# Patient Record
Sex: FEMALE | Race: WHITE | NOT HISPANIC OR LATINO | URBAN - METROPOLITAN AREA
[De-identification: names, ages, dates, MRNs, and addresses within clinical notes are randomized per-mention and may not be internally consistent; named-entity substitution may affect disease eponyms.]

---

## 2020-08-19 ENCOUNTER — OFFICE VISIT (OUTPATIENT)
Dept: URBAN - METROPOLITAN AREA CLINIC 111 | Facility: CLINIC | Age: 85
End: 2020-08-19

## 2021-03-02 ENCOUNTER — TELEPHONE ENCOUNTER (OUTPATIENT)
Dept: URBAN - METROPOLITAN AREA CLINIC 12 | Facility: CLINIC | Age: 86
End: 2021-03-02

## 2021-03-05 LAB
ALBUMIN: 4.4
ALKALINE PHOSPHATASE: 90
ALT (SGPT): 19
AST (SGOT): 25
BILIRUBIN, DIRECT: 0.11
BILIRUBIN, TOTAL: 0.3
HEMATOCRIT: 38.1
HEMOGLOBIN: 13.1
MCH: 33.7
MCHC: 34.4
MCV: 98
NRBC: (no result)
PLATELETS: 279
PROTEIN, TOTAL: 7
RBC: 3.89
RDW: 13.1
WBC: 7

## 2021-03-23 ENCOUNTER — OFFICE VISIT (OUTPATIENT)
Dept: URBAN - METROPOLITAN AREA CLINIC 12 | Facility: CLINIC | Age: 86
End: 2021-03-23
Payer: MEDICARE

## 2021-03-23 VITALS
HEART RATE: 61 BPM | WEIGHT: 142 LBS | SYSTOLIC BLOOD PRESSURE: 146 MMHG | HEIGHT: 64 IN | DIASTOLIC BLOOD PRESSURE: 70 MMHG | TEMPERATURE: 96.8 F | BODY MASS INDEX: 24.24 KG/M2

## 2021-03-23 DIAGNOSIS — R14.0 BLOATING AND GAS: ICD-10-CM

## 2021-03-23 PROCEDURE — 99213 OFFICE O/P EST LOW 20 MIN: CPT | Performed by: INTERNAL MEDICINE

## 2021-03-23 RX ORDER — SACCHAROMYCES BOULARDII 250 MG
CAPSULE ORAL
Qty: 0 | Refills: 0 | Status: ACTIVE | COMMUNITY
Start: 1900-01-01

## 2021-03-23 RX ORDER — NIFEDIPINE 30 MG/1
TABLET, EXTENDED RELEASE ORAL
Qty: 0 | Refills: 0 | Status: ACTIVE | COMMUNITY
Start: 1900-01-01

## 2021-03-23 RX ORDER — CLONIDINE HYDROCHLORIDE 0.1 MG/1
TABLET ORAL
Qty: 0 | Refills: 0 | COMMUNITY
Start: 1900-01-01

## 2021-03-23 RX ORDER — LOPERAMIDE HYDROCHLORIDE 2 MG/1
TAKE 2 CAPSULES (4 MG) BY ORAL ROUTE AFTER 1ST LOOSE STOOL, FOLLOWED BY 1 CAPSULE AFTER EACH SUBSEQUENT LOOSE STOOL NOT TO EXCEED 16 MG/DAY CAPSULE ORAL
Qty: 2 | Refills: 0 | Status: ACTIVE | COMMUNITY
Start: 1900-01-01

## 2021-03-23 RX ORDER — BIMATOPROST 0.1 MG/ML
SOLUTION/ DROPS OPHTHALMIC
Qty: 1 | Refills: 0 | Status: ACTIVE | COMMUNITY
Start: 1900-01-01

## 2021-03-23 RX ORDER — QUINAPRIL HYDROCHLORIDE 20 MG/1
TABLET, FILM COATED ORAL
Qty: 0 | Refills: 0 | COMMUNITY
Start: 1900-01-01

## 2021-03-23 RX ORDER — COLD-HOT PACK
EACH MISCELLANEOUS
Qty: 0 | Refills: 0 | COMMUNITY
Start: 1900-01-01

## 2021-03-23 RX ORDER — BRIMONIDINE TARTRATE 1 MG/ML
1 DROP INTO AFFECTED EYE SOLUTION/ DROPS OPHTHALMIC
Status: ACTIVE | COMMUNITY

## 2021-03-23 RX ORDER — ESCITALOPRAM 20 MG/1
TAKE 1 TABLET (20 MG) BY ORAL ROUTE ONCE DAILY TABLET, FILM COATED ORAL 1
Qty: 0 | Refills: 0 | Status: ACTIVE | COMMUNITY
Start: 1900-01-01

## 2021-03-23 RX ORDER — CLONAZEPAM 0.25 MG/1
TABLET, ORALLY DISINTEGRATING ORAL
Qty: 0 | Refills: 0 | COMMUNITY
Start: 1900-01-01

## 2021-03-23 RX ORDER — CETIRIZINE HYDROCHLORIDE 10 MG/1
TABLET, FILM COATED ORAL
Qty: 0 | Refills: 0 | COMMUNITY
Start: 1900-01-01

## 2021-03-23 RX ORDER — SODIUM CHLORIDE 0.65 %
AEROSOL, SPRAY (ML) NASAL
Qty: 1 | Refills: 0 | Status: ACTIVE | COMMUNITY
Start: 1900-01-01

## 2021-03-23 RX ORDER — PEPPERMINT OIL 90 MG
1 CAPSULE CAPSULE, DELAYED, AND EXTENDED RELEASE ORAL
Qty: 30 | Refills: 5 | OUTPATIENT
Start: 2021-03-23 | End: 2021-09-19

## 2021-03-23 RX ORDER — CHLORHEXIDINE GLUCONATE 4 %
LIQUID (ML) TOPICAL
Qty: 0 | Refills: 0 | COMMUNITY
Start: 1900-01-01

## 2021-03-23 RX ORDER — DORZOLAMIDE HCL 20 MG/ML
1 DROP INTO AFFECTED EYE SOLUTION/ DROPS OPHTHALMIC
Status: ACTIVE | COMMUNITY

## 2021-03-23 RX ORDER — PROMETHAZINE HYDROCHLORIDE 25 MG/1
1 TABLET AS NEEDED TABLET ORAL
Status: ACTIVE | COMMUNITY

## 2021-03-23 RX ORDER — AZATHIOPRINE 50 1/1
TABLET ORAL
Qty: 0 | Refills: 0 | Status: ACTIVE | COMMUNITY
Start: 1900-01-01

## 2021-03-23 RX ORDER — SENNOSIDES 8.6 MG/1
TABLET, FILM COATED ORAL
Qty: 0 | Refills: 0 | COMMUNITY
Start: 1900-01-01

## 2021-03-23 NOTE — HPI-TODAY'S VISIT:
85F here for routine f/u. An assistant from the facility where derrek lives is here with her AIH Panc cyst--pt and dtr dont want any further w/u H/o GI bleed--pt and dtr did not want egd. resolved on its own . she feels fine. no major complaints no overt gi bleed. no abd pain, jaundice, N/V  she does have bloating and gas on and off. BM regular.  labs--normal Hb and LFTs

## 2022-04-11 ENCOUNTER — TELEPHONE ENCOUNTER (OUTPATIENT)
Dept: URBAN - METROPOLITAN AREA CLINIC 12 | Facility: CLINIC | Age: 87
End: 2022-04-11

## 2022-04-23 LAB
ALBUMIN: 4.3
ALKALINE PHOSPHATASE: 103
ALT (SGPT): 25
AST (SGOT): 37
BILIRUBIN, DIRECT: 0.15
BILIRUBIN, TOTAL: 0.6
HEMATOCRIT: 38.1
HEMOGLOBIN: 13.1
MCH: 33.2
MCHC: 34.4
MCV: 97
NRBC: (no result)
PLATELETS: 293
PROTEIN, TOTAL: 7.4
RBC: 3.94
RDW: 12.9
WBC: 6.4

## 2022-04-26 ENCOUNTER — OFFICE VISIT (OUTPATIENT)
Dept: URBAN - METROPOLITAN AREA CLINIC 12 | Facility: CLINIC | Age: 87
End: 2022-04-26

## 2022-04-26 ENCOUNTER — OFFICE VISIT (OUTPATIENT)
Dept: URBAN - METROPOLITAN AREA CLINIC 23 | Facility: CLINIC | Age: 87
End: 2022-04-26
Payer: MEDICARE

## 2022-04-26 ENCOUNTER — WEB ENCOUNTER (OUTPATIENT)
Dept: URBAN - METROPOLITAN AREA CLINIC 23 | Facility: CLINIC | Age: 87
End: 2022-04-26

## 2022-04-26 DIAGNOSIS — K75.4 AUTOIMMUNE HEPATITIS: ICD-10-CM

## 2022-04-26 PROCEDURE — 99213 OFFICE O/P EST LOW 20 MIN: CPT | Performed by: INTERNAL MEDICINE

## 2022-04-26 RX ORDER — NIFEDIPINE 30 MG/1
TABLET, EXTENDED RELEASE ORAL
Qty: 0 | Refills: 0 | Status: ACTIVE | COMMUNITY
Start: 1900-01-01

## 2022-04-26 RX ORDER — SACCHAROMYCES BOULARDII 250 MG
CAPSULE ORAL
Qty: 0 | Refills: 0 | Status: ACTIVE | COMMUNITY
Start: 1900-01-01

## 2022-04-26 RX ORDER — ESCITALOPRAM 20 MG/1
TAKE 1 TABLET (20 MG) BY ORAL ROUTE ONCE DAILY TABLET, FILM COATED ORAL 1
Qty: 0 | Refills: 0 | Status: ACTIVE | COMMUNITY
Start: 1900-01-01

## 2022-04-26 RX ORDER — BIMATOPROST 0.1 MG/ML
SOLUTION/ DROPS OPHTHALMIC
Qty: 1 | Refills: 0 | Status: ACTIVE | COMMUNITY
Start: 1900-01-01

## 2022-04-26 RX ORDER — CLONAZEPAM 0.25 MG/1
TABLET, ORALLY DISINTEGRATING ORAL
Qty: 0 | Refills: 0 | COMMUNITY
Start: 1900-01-01

## 2022-04-26 RX ORDER — CLONIDINE HYDROCHLORIDE 0.1 MG/1
TABLET ORAL
Qty: 0 | Refills: 0 | COMMUNITY
Start: 1900-01-01

## 2022-04-26 RX ORDER — CHLORHEXIDINE GLUCONATE 4 %
LIQUID (ML) TOPICAL
Qty: 0 | Refills: 0 | COMMUNITY
Start: 1900-01-01

## 2022-04-26 RX ORDER — PROMETHAZINE HYDROCHLORIDE 25 MG/1
1 TABLET AS NEEDED TABLET ORAL
Status: ACTIVE | COMMUNITY

## 2022-04-26 RX ORDER — LOPERAMIDE HYDROCHLORIDE 2 MG/1
TAKE 2 CAPSULES (4 MG) BY ORAL ROUTE AFTER 1ST LOOSE STOOL, FOLLOWED BY 1 CAPSULE AFTER EACH SUBSEQUENT LOOSE STOOL NOT TO EXCEED 16 MG/DAY CAPSULE ORAL
Qty: 2 | Refills: 0 | COMMUNITY
Start: 1900-01-01

## 2022-04-26 RX ORDER — AZATHIOPRINE 50 1/1
TABLET ORAL
Qty: 0 | Refills: 0 | Status: ACTIVE | COMMUNITY
Start: 1900-01-01

## 2022-04-26 RX ORDER — COLD-HOT PACK
EACH MISCELLANEOUS
Qty: 0 | Refills: 0 | COMMUNITY
Start: 1900-01-01

## 2022-04-26 RX ORDER — ESCITALOPRAM 20 MG/1
TAKE 1 TABLET (20 MG) BY ORAL ROUTE ONCE DAILY TABLET, FILM COATED ORAL 1
Qty: 0 | Refills: 0 | COMMUNITY
Start: 1900-01-01

## 2022-04-26 RX ORDER — NIFEDIPINE 30 MG/1
TABLET, EXTENDED RELEASE ORAL
Qty: 0 | Refills: 0 | COMMUNITY
Start: 1900-01-01

## 2022-04-26 RX ORDER — SODIUM CHLORIDE 0.65 %
AEROSOL, SPRAY (ML) NASAL
Qty: 1 | Refills: 0 | COMMUNITY
Start: 1900-01-01

## 2022-04-26 RX ORDER — SACCHAROMYCES BOULARDII 250 MG
CAPSULE ORAL
Qty: 0 | Refills: 0 | COMMUNITY
Start: 1900-01-01

## 2022-04-26 RX ORDER — DORZOLAMIDE HCL 20 MG/ML
1 DROP INTO AFFECTED EYE SOLUTION/ DROPS OPHTHALMIC
COMMUNITY

## 2022-04-26 RX ORDER — BRIMONIDINE TARTRATE 1 MG/ML
1 DROP INTO AFFECTED EYE SOLUTION/ DROPS OPHTHALMIC
Status: ACTIVE | COMMUNITY

## 2022-04-26 RX ORDER — DORZOLAMIDE HCL 20 MG/ML
1 DROP INTO AFFECTED EYE SOLUTION/ DROPS OPHTHALMIC
Status: ACTIVE | COMMUNITY

## 2022-04-26 RX ORDER — CETIRIZINE HYDROCHLORIDE 10 MG/1
TABLET, FILM COATED ORAL
Qty: 0 | Refills: 0 | COMMUNITY
Start: 1900-01-01

## 2022-04-26 RX ORDER — SENNOSIDES 8.6 MG/1
TABLET, FILM COATED ORAL
Qty: 0 | Refills: 0 | COMMUNITY
Start: 1900-01-01

## 2022-04-26 RX ORDER — SODIUM CHLORIDE 0.65 %
AEROSOL, SPRAY (ML) NASAL
Qty: 1 | Refills: 0 | Status: ACTIVE | COMMUNITY
Start: 1900-01-01

## 2022-04-26 RX ORDER — BRIMONIDINE TARTRATE 1 MG/ML
1 DROP INTO AFFECTED EYE SOLUTION/ DROPS OPHTHALMIC
COMMUNITY

## 2022-04-26 RX ORDER — BIMATOPROST 0.1 MG/ML
SOLUTION/ DROPS OPHTHALMIC
Qty: 1 | Refills: 0 | COMMUNITY
Start: 1900-01-01

## 2022-04-26 RX ORDER — PROMETHAZINE HYDROCHLORIDE 25 MG/1
1 TABLET AS NEEDED TABLET ORAL
COMMUNITY

## 2022-04-26 RX ORDER — AZATHIOPRINE 50 1/1
TABLET ORAL
Qty: 0 | Refills: 0 | COMMUNITY
Start: 1900-01-01

## 2022-04-26 RX ORDER — LOPERAMIDE HYDROCHLORIDE 2 MG/1
TAKE 2 CAPSULES (4 MG) BY ORAL ROUTE AFTER 1ST LOOSE STOOL, FOLLOWED BY 1 CAPSULE AFTER EACH SUBSEQUENT LOOSE STOOL NOT TO EXCEED 16 MG/DAY CAPSULE ORAL
Qty: 2 | Refills: 0 | Status: ACTIVE | COMMUNITY
Start: 1900-01-01

## 2022-04-26 RX ORDER — QUINAPRIL HYDROCHLORIDE 20 MG/1
TABLET, FILM COATED ORAL
Qty: 0 | Refills: 0 | COMMUNITY
Start: 1900-01-01

## 2022-04-26 NOTE — HPI-TODAY'S VISIT:
86F here for routine f/u.  AIH--on azathioprine Panc cyst--pt and dtr dont want any further w/u H/o GI bleed--pt and dtr did not want egd. resolved on its own . she feels fine. no major complaints no overt gi bleed. no abd pain, jaundice, N/V  labs 4/2022--normal Hb and LFTs

## 2023-02-23 ENCOUNTER — TELEPHONE ENCOUNTER (OUTPATIENT)
Dept: URBAN - METROPOLITAN AREA CLINIC 23 | Facility: CLINIC | Age: 88
End: 2023-02-23

## 2023-05-16 ENCOUNTER — OFFICE VISIT (OUTPATIENT)
Dept: URBAN - METROPOLITAN AREA CLINIC 111 | Facility: CLINIC | Age: 88
End: 2023-05-16

## 2023-05-23 ENCOUNTER — TELEPHONE ENCOUNTER (OUTPATIENT)
Dept: URBAN - METROPOLITAN AREA CLINIC 23 | Facility: CLINIC | Age: 88
End: 2023-05-23

## 2023-07-18 ENCOUNTER — OFFICE VISIT (OUTPATIENT)
Dept: URBAN - METROPOLITAN AREA CLINIC 111 | Facility: CLINIC | Age: 88
End: 2023-07-18

## 2023-07-25 ENCOUNTER — TELEPHONE ENCOUNTER (OUTPATIENT)
Dept: URBAN - METROPOLITAN AREA CLINIC 111 | Facility: CLINIC | Age: 88
End: 2023-07-25

## 2023-07-28 LAB
A/G RATIO: 1.3
ALBUMIN: 4.1
ALKALINE PHOSPHATASE: 72
ALT (SGPT): 21
AST (SGOT): 29
BILIRUBIN, TOTAL: 0.7
BUN/CREATININE RATIO: (no result)
BUN: 13
CALCIUM: 8.9
CARBON DIOXIDE, TOTAL: 28
CHLORIDE: 101
CREATININE: 0.74
EGFR: 79
GLOBULIN, TOTAL: 3.1
GLUCOSE: 99
HEMATOCRIT: 37.4
HEMOGLOBIN: 13.1
MCH: 33.2
MCHC: 35
MCV: 94.9
MPV: 9.7
PLATELET COUNT: 294
POTASSIUM: 4.4
PROTEIN, TOTAL: 7.2
RDW: 13.7
RED BLOOD CELL COUNT: 3.94
SODIUM: 136
WHITE BLOOD CELL COUNT: 6.4

## 2023-08-01 ENCOUNTER — WEB ENCOUNTER (OUTPATIENT)
Dept: URBAN - METROPOLITAN AREA CLINIC 111 | Facility: CLINIC | Age: 88
End: 2023-08-01

## 2023-08-01 ENCOUNTER — OFFICE VISIT (OUTPATIENT)
Dept: URBAN - METROPOLITAN AREA CLINIC 111 | Facility: CLINIC | Age: 88
End: 2023-08-01
Payer: MEDICARE

## 2023-08-01 ENCOUNTER — DASHBOARD ENCOUNTERS (OUTPATIENT)
Age: 88
End: 2023-08-01

## 2023-08-01 VITALS
SYSTOLIC BLOOD PRESSURE: 136 MMHG | BODY MASS INDEX: 24.01 KG/M2 | WEIGHT: 140.6 LBS | HEIGHT: 64 IN | HEART RATE: 62 BPM | DIASTOLIC BLOOD PRESSURE: 74 MMHG

## 2023-08-01 DIAGNOSIS — K75.4 AUTOIMMUNE HEPATITIS: ICD-10-CM

## 2023-08-01 PROCEDURE — 99213 OFFICE O/P EST LOW 20 MIN: CPT | Performed by: INTERNAL MEDICINE

## 2023-08-01 RX ORDER — SODIUM CHLORIDE 0.65 %
AEROSOL, SPRAY (ML) NASAL
Qty: 1 | Refills: 0 | Status: ON HOLD | COMMUNITY
Start: 1900-01-01

## 2023-08-01 RX ORDER — FLUOROURACIL 5 MG/G
1 APPLICATION CREAM TOPICAL
Status: ACTIVE | COMMUNITY

## 2023-08-01 RX ORDER — LISINOPRIL 20 MG/1
1 TABLET TABLET ORAL
Status: ACTIVE | COMMUNITY

## 2023-08-01 RX ORDER — TRAZODONE HYDROCHLORIDE 50 MG/1
1 TABLET AT BEDTIME AS NEEDED TABLET ORAL
Status: ACTIVE | COMMUNITY

## 2023-08-01 RX ORDER — NIFEDIPINE 30 MG/1
TABLET, EXTENDED RELEASE ORAL
Qty: 0 | Refills: 0 | Status: ACTIVE | COMMUNITY
Start: 1900-01-01

## 2023-08-01 RX ORDER — CETIRIZINE HYDROCHLORIDE 10 MG/1
TABLET, FILM COATED ORAL
Qty: 0 | Refills: 0 | Status: ACTIVE | COMMUNITY
Start: 1900-01-01

## 2023-08-01 RX ORDER — CHLORHEXIDINE GLUCONATE 4 %
LIQUID (ML) TOPICAL
Qty: 0 | Refills: 0 | Status: ON HOLD | COMMUNITY
Start: 1900-01-01

## 2023-08-01 RX ORDER — ICOSAPENT ETHYL 1 G/1
2 CAPSULES WITH MEALS CAPSULE ORAL
Status: ACTIVE | COMMUNITY

## 2023-08-01 RX ORDER — DORZOLAMIDE HCL 20 MG/ML
1 DROP INTO AFFECTED EYE SOLUTION/ DROPS OPHTHALMIC
Status: ACTIVE | COMMUNITY

## 2023-08-01 RX ORDER — LOPERAMIDE HYDROCHLORIDE 2 MG/1
TAKE 2 CAPSULES (4 MG) BY ORAL ROUTE AFTER 1ST LOOSE STOOL, FOLLOWED BY 1 CAPSULE AFTER EACH SUBSEQUENT LOOSE STOOL NOT TO EXCEED 16 MG/DAY CAPSULE ORAL
Qty: 2 | Refills: 0 | Status: ACTIVE | COMMUNITY
Start: 1900-01-01

## 2023-08-01 RX ORDER — LANOLIN ALCOHOL/MO/W.PET/CERES
AS DIRECTED CREAM (GRAM) TOPICAL
Status: ACTIVE | COMMUNITY

## 2023-08-01 RX ORDER — QUINAPRIL HYDROCHLORIDE 20 MG/1
TABLET, FILM COATED ORAL
Qty: 0 | Refills: 0 | Status: ON HOLD | COMMUNITY
Start: 1900-01-01

## 2023-08-01 RX ORDER — BRIMONIDINE TARTRATE 1 MG/ML
1 DROP INTO AFFECTED EYE SOLUTION/ DROPS OPHTHALMIC
Status: ACTIVE | COMMUNITY

## 2023-08-01 RX ORDER — AZATHIOPRINE 50 1/1
TABLET ORAL
Qty: 0 | Refills: 0 | Status: ACTIVE | COMMUNITY
Start: 1900-01-01

## 2023-08-01 RX ORDER — COLD-HOT PACK
EACH MISCELLANEOUS
Qty: 0 | Refills: 0 | Status: ON HOLD | COMMUNITY
Start: 1900-01-01

## 2023-08-01 RX ORDER — PEPPERMINT OIL 90 MG
AS DIRECTED CAPSULE, DELAYED, AND EXTENDED RELEASE ORAL
Status: ACTIVE | COMMUNITY

## 2023-08-01 RX ORDER — CLONIDINE HYDROCHLORIDE 0.1 MG/1
TABLET ORAL
Qty: 0 | Refills: 0 | Status: ACTIVE | COMMUNITY
Start: 1900-01-01

## 2023-08-01 RX ORDER — SENNOSIDES 8.6 MG/1
TABLET, FILM COATED ORAL
Qty: 0 | Refills: 0 | Status: ACTIVE | COMMUNITY
Start: 1900-01-01

## 2023-08-01 RX ORDER — ESCITALOPRAM 20 MG/1
TAKE 1 TABLET (20 MG) BY ORAL ROUTE ONCE DAILY TABLET, FILM COATED ORAL 1
Qty: 0 | Refills: 0 | Status: ACTIVE | COMMUNITY
Start: 1900-01-01

## 2023-08-01 RX ORDER — CLONAZEPAM 0.25 MG/1
TABLET, ORALLY DISINTEGRATING ORAL
Qty: 0 | Refills: 0 | Status: ACTIVE | COMMUNITY
Start: 1900-01-01

## 2023-08-01 RX ORDER — PROMETHAZINE HYDROCHLORIDE 25 MG/1
1 TABLET AS NEEDED TABLET ORAL
Status: ON HOLD | COMMUNITY

## 2023-08-01 RX ORDER — SACCHAROMYCES BOULARDII 250 MG
CAPSULE ORAL
Qty: 0 | Refills: 0 | Status: ACTIVE | COMMUNITY
Start: 1900-01-01

## 2023-08-01 RX ORDER — BIMATOPROST 0.1 MG/ML
SOLUTION/ DROPS OPHTHALMIC
Qty: 1 | Refills: 0 | Status: ACTIVE | COMMUNITY
Start: 1900-01-01

## 2023-08-01 RX ORDER — KETOCONAZOLE 20 MG/ML
AS DIRECTED SHAMPOO, SUSPENSION TOPICAL
Status: ACTIVE | COMMUNITY

## 2023-08-01 NOTE — HPI-TODAY'S VISIT:
87F here for routine f/u.  AIH--on azathioprine Panc cyst--pt and dtr dont want any further w/u H/o GI bleed--pt and dtr did not want egd. resolved on its own . she feels fine. no major complaints no overt gi bleed. no abd pain, jaundice, N/V  labs 7/2023--normal CBC and LFTs

## 2024-01-11 ENCOUNTER — APPOINTMENT (RX ONLY)
Dept: URBAN - METROPOLITAN AREA CLINIC 159 | Facility: CLINIC | Age: 89
Setting detail: DERMATOLOGY
End: 2024-01-11

## 2024-01-11 DIAGNOSIS — D49.2 NEOPLASM OF UNSPECIFIED BEHAVIOR OF BONE, SOFT TISSUE, AND SKIN: ICD-10-CM

## 2024-01-11 PROCEDURE — 99212 OFFICE O/P EST SF 10 MIN: CPT

## 2024-01-11 PROCEDURE — ? COUNSELING

## 2024-01-11 ASSESSMENT — LOCATION SIMPLE DESCRIPTION DERM: LOCATION SIMPLE: LEFT INFERIOR EYELID

## 2024-01-11 ASSESSMENT — LOCATION ZONE DERM: LOCATION ZONE: EYELID

## 2024-01-11 ASSESSMENT — LOCATION DETAILED DESCRIPTION DERM: LOCATION DETAILED: LEFT INFERIOR LID MARGIN

## 2024-01-11 NOTE — PROCEDURE: COUNSELING
Detail Level: Detailed
Patient Specific Counseling (Will Not Stick From Patient To Patient): Pt. Here today with caregiver. Pt. Seen by Dr. Snow who rec. biopsy but Pt. Declined. Pt. States eyelid was biopsied back in 2016, reports BCC . Excision done by Dr. Roman. Caregiver states spot on eyelid has been present x 3 years since she's been caring for patient.\\n\\nDiscussed oral treatment as option. Risk of possible hair loss discussed. \\n\\nDiscussed risk of not treating including non-healing lesion, getting larger, obstructive vision. \\n\\nPt. To sign MR to have records sent from Dr. Roman

## 2024-05-28 ENCOUNTER — APPOINTMENT (RX ONLY)
Dept: URBAN - METROPOLITAN AREA CLINIC 159 | Facility: CLINIC | Age: 89
Setting detail: DERMATOLOGY
End: 2024-05-28

## 2024-05-28 DIAGNOSIS — D485 NEOPLASM OF UNCERTAIN BEHAVIOR OF SKIN: ICD-10-CM | Status: INADEQUATELY CONTROLLED

## 2024-05-28 PROBLEM — D48.5 NEOPLASM OF UNCERTAIN BEHAVIOR OF SKIN: Status: ACTIVE | Noted: 2024-05-28

## 2024-05-28 PROCEDURE — 99212 OFFICE O/P EST SF 10 MIN: CPT

## 2024-05-28 PROCEDURE — ? DIAGNOSIS COMMENT

## 2024-05-28 PROCEDURE — ? COUNSELING

## 2024-05-28 ASSESSMENT — LOCATION ZONE DERM: LOCATION ZONE: EYELID

## 2024-05-28 ASSESSMENT — LOCATION DETAILED DESCRIPTION DERM: LOCATION DETAILED: LEFT LATERAL INFERIOR EYELID

## 2024-05-28 ASSESSMENT — LOCATION SIMPLE DESCRIPTION DERM: LOCATION SIMPLE: LEFT INFERIOR EYELID

## 2024-05-28 NOTE — PROCEDURE: DIAGNOSIS COMMENT
Render Risk Assessment In Note?: no
Comment: Discussed following up with  or ophthalmologist for consultation on best treatment action for patient , discussed MOHS surgery for this site vs oral chemo therapy r/b/s.e discussed vs not treating r/b/s.e discussed
Detail Level: Simple

## 2024-05-28 NOTE — PROCEDURE: MIPS QUALITY
Quality 47: Advance Care Plan: Advance Care Planning discussed and documented; advance care plan or surrogate decision maker documented in the medical record.
Quality 226: Preventive Care And Screening: Tobacco Use: Screening And Cessation Intervention: Patient screened for tobacco use and is an ex/non-smoker
Detail Level: Detailed
Name And Contact Information For Health Care Proxy: Kailey Sorensen daughter [phone]

## 2024-05-28 NOTE — HPI: SKIN LESION
Is This A New Presentation, Or A Follow-Up?: Follow Up Skin Lesion
How Severe Is Your Skin Lesion?: moderate
Has Your Skin Lesion Been Treated?: not been treated
Additional History: Was seen by [facility] and [facility]. Area has not been biopsied. Uncertain if want to do any intervention.

## 2024-08-08 ENCOUNTER — TELEPHONE ENCOUNTER (OUTPATIENT)
Dept: URBAN - METROPOLITAN AREA CLINIC 111 | Facility: CLINIC | Age: 89
End: 2024-08-08

## 2024-08-14 LAB
ABSOLUTE BASOPHILS: 29
ABSOLUTE EOSINOPHILS: 70
ABSOLUTE LYMPHOCYTES: 1966
ABSOLUTE MONOCYTES: 476
ABSOLUTE NEUTROPHILS: 3260
ALBUMIN/GLOBULIN RATIO: 1.3
ALBUMIN: 4
ALKALINE PHOSPHATASE: 100
ALT (SGPT): 31
AST (SGOT): 46
BASOPHILS: 0.5
BILIRUBIN, DIRECT: 0.2
BILIRUBIN, INDIRECT: 0.5
BILIRUBIN, TOTAL: 0.7
EOSINOPHILS: 1.2
GLOBULIN: 3
HEMATOCRIT: 40.6
HEMOGLOBIN: 13.5
LYMPHOCYTES: 33.9
MCH: 32.8
MCHC: 33.3
MCV: 98.5
MONOCYTES: 8.2
MPV: 10.4
NEUTROPHILS: 56.2
PLATELET COUNT: 265
PROTEIN, TOTAL: 7
RDW: 12.8
RED BLOOD CELL COUNT: 4.12
WHITE BLOOD CELL COUNT: 5.8

## 2024-08-19 ENCOUNTER — OFFICE VISIT (OUTPATIENT)
Dept: URBAN - METROPOLITAN AREA CLINIC 111 | Facility: CLINIC | Age: 89
End: 2024-08-19
Payer: MEDICARE

## 2024-08-19 VITALS
BODY MASS INDEX: 21 KG/M2 | HEART RATE: 77 BPM | DIASTOLIC BLOOD PRESSURE: 62 MMHG | HEIGHT: 64 IN | SYSTOLIC BLOOD PRESSURE: 109 MMHG | TEMPERATURE: 97.5 F | WEIGHT: 123 LBS

## 2024-08-19 DIAGNOSIS — Z51.5 HOSPICE CARE PATIENT: ICD-10-CM

## 2024-08-19 DIAGNOSIS — R79.89 ELEVATED LFTS: ICD-10-CM

## 2024-08-19 DIAGNOSIS — K75.4 AUTOIMMUNE HEPATITIS: ICD-10-CM

## 2024-08-19 PROCEDURE — 99214 OFFICE O/P EST MOD 30 MIN: CPT | Performed by: PHYSICIAN ASSISTANT

## 2024-08-19 RX ORDER — BRIMONIDINE TARTRATE 1 MG/ML
1 DROP INTO AFFECTED EYE SOLUTION/ DROPS OPHTHALMIC
Status: ACTIVE | COMMUNITY

## 2024-08-19 RX ORDER — CETIRIZINE HYDROCHLORIDE 10 MG/1
TABLET, FILM COATED ORAL
Qty: 0 | Refills: 0 | Status: ACTIVE | COMMUNITY
Start: 1900-01-01

## 2024-08-19 RX ORDER — DORZOLAMIDE HCL 20 MG/ML
1 DROP INTO AFFECTED EYE SOLUTION/ DROPS OPHTHALMIC
Status: ACTIVE | COMMUNITY

## 2024-08-19 RX ORDER — LOPERAMIDE HYDROCHLORIDE 2 MG/1
TAKE 2 CAPSULES (4 MG) BY ORAL ROUTE AFTER 1ST LOOSE STOOL, FOLLOWED BY 1 CAPSULE AFTER EACH SUBSEQUENT LOOSE STOOL NOT TO EXCEED 16 MG/DAY CAPSULE ORAL
Qty: 2 | Refills: 0 | Status: ACTIVE | COMMUNITY
Start: 1900-01-01

## 2024-08-19 RX ORDER — QUINAPRIL HYDROCHLORIDE 20 MG/1
TABLET, FILM COATED ORAL
Qty: 0 | Refills: 0 | Status: ON HOLD | COMMUNITY
Start: 1900-01-01

## 2024-08-19 RX ORDER — NIFEDIPINE 30 MG/1
TABLET, EXTENDED RELEASE ORAL
Qty: 0 | Refills: 0 | Status: ACTIVE | COMMUNITY
Start: 1900-01-01

## 2024-08-19 RX ORDER — CLONAZEPAM 0.25 MG/1
TABLET, ORALLY DISINTEGRATING ORAL
Qty: 0 | Refills: 0 | Status: ACTIVE | COMMUNITY
Start: 1900-01-01

## 2024-08-19 RX ORDER — PEPPERMINT OIL 90 MG
AS DIRECTED CAPSULE, DELAYED, AND EXTENDED RELEASE ORAL
Status: ACTIVE | COMMUNITY

## 2024-08-19 RX ORDER — AZATHIOPRINE 50 1/1
TABLET ORAL
Qty: 0 | Refills: 0 | Status: ACTIVE | COMMUNITY
Start: 1900-01-01

## 2024-08-19 RX ORDER — ESCITALOPRAM 20 MG/1
TAKE 1 TABLET (20 MG) BY ORAL ROUTE ONCE DAILY TABLET, FILM COATED ORAL 1
Qty: 0 | Refills: 0 | Status: ACTIVE | COMMUNITY
Start: 1900-01-01

## 2024-08-19 RX ORDER — FLUOROURACIL 5 MG/G
1 APPLICATION CREAM TOPICAL
Status: ACTIVE | COMMUNITY

## 2024-08-19 RX ORDER — CHLORHEXIDINE GLUCONATE 4 %
LIQUID (ML) TOPICAL
Qty: 0 | Refills: 0 | Status: ON HOLD | COMMUNITY
Start: 1900-01-01

## 2024-08-19 RX ORDER — LANOLIN ALCOHOL/MO/W.PET/CERES
AS DIRECTED CREAM (GRAM) TOPICAL
Status: ACTIVE | COMMUNITY

## 2024-08-19 RX ORDER — LISINOPRIL 20 MG/1
1 TABLET TABLET ORAL
Status: ACTIVE | COMMUNITY

## 2024-08-19 RX ORDER — SENNOSIDES 8.6 MG/1
TABLET, FILM COATED ORAL
Qty: 0 | Refills: 0 | Status: ACTIVE | COMMUNITY
Start: 1900-01-01

## 2024-08-19 RX ORDER — KETOCONAZOLE 20 MG/ML
AS DIRECTED SHAMPOO, SUSPENSION TOPICAL
Status: ACTIVE | COMMUNITY

## 2024-08-19 RX ORDER — SACCHAROMYCES BOULARDII 250 MG
CAPSULE ORAL
Qty: 0 | Refills: 0 | Status: ACTIVE | COMMUNITY
Start: 1900-01-01

## 2024-08-19 RX ORDER — ICOSAPENT ETHYL 1 G/1
2 CAPSULES WITH MEALS CAPSULE ORAL
Status: ACTIVE | COMMUNITY

## 2024-08-19 RX ORDER — TRAZODONE HYDROCHLORIDE 50 MG/1
1 TABLET AT BEDTIME AS NEEDED TABLET ORAL
Status: ACTIVE | COMMUNITY

## 2024-08-19 RX ORDER — SODIUM CHLORIDE 0.65 %
AEROSOL, SPRAY (ML) NASAL
Qty: 1 | Refills: 0 | Status: ON HOLD | COMMUNITY
Start: 1900-01-01

## 2024-08-19 RX ORDER — BIMATOPROST 0.1 MG/ML
SOLUTION/ DROPS OPHTHALMIC
Qty: 1 | Refills: 0 | Status: ACTIVE | COMMUNITY
Start: 1900-01-01

## 2024-08-19 RX ORDER — PROMETHAZINE HYDROCHLORIDE 25 MG/1
1 TABLET AS NEEDED TABLET ORAL
Status: ON HOLD | COMMUNITY

## 2024-08-19 RX ORDER — CLONIDINE HYDROCHLORIDE 0.1 MG/1
TABLET ORAL
Qty: 0 | Refills: 0 | Status: ACTIVE | COMMUNITY
Start: 1900-01-01

## 2024-08-19 RX ORDER — COLD-HOT PACK
EACH MISCELLANEOUS
Qty: 0 | Refills: 0 | Status: ON HOLD | COMMUNITY
Start: 1900-01-01

## 2024-08-19 NOTE — PHYSICAL EXAM CONSTITUTIONAL:
well developed, well nourished, in no acute distress, ambulating with walker, limited communication ability

## 2024-08-19 NOTE — HPI-TODAY'S VISIT:
89 y/o female here for annual follow up. Seen in 8/2023 by Dr. Winters for autoimmune hepatitis. Pt was told to continue Azathioprine and told to get CBC/LFTs checked every 3 months. She has h/o pancreatic cyst and did not want any further work up. Labs in 7/2023 with normal CBC & LFTs. Recent labs on 8/14 with elevated LFTs: AST 46 & ALT 31. CBC WNL.  She has advanced dementia and here with her caregiver who provides the history. Pt is now on hospice. She has been losing weight. She was 140 lbs 1 year ago and 123 lbs today. She has been refusing meds on some days. She gets constipated some days and OTC laxative helps. She does not eat well everyday.

## 2024-09-03 ENCOUNTER — OFFICE VISIT (OUTPATIENT)
Dept: URBAN - METROPOLITAN AREA CLINIC 111 | Facility: CLINIC | Age: 89
End: 2024-09-03